# Patient Record
Sex: FEMALE | Race: WHITE | NOT HISPANIC OR LATINO | Employment: STUDENT | URBAN - METROPOLITAN AREA
[De-identification: names, ages, dates, MRNs, and addresses within clinical notes are randomized per-mention and may not be internally consistent; named-entity substitution may affect disease eponyms.]

---

## 2021-06-17 ENCOUNTER — HOSPITAL ENCOUNTER (OUTPATIENT)
Dept: LAB | Facility: MEDICAL CENTER | Age: 21
End: 2021-06-17
Attending: INTERNAL MEDICINE
Payer: COMMERCIAL

## 2021-06-17 LAB
ALBUMIN SERPL BCP-MCNC: 4.5 G/DL (ref 3.2–4.9)
ALBUMIN/GLOB SERPL: 1.6 G/DL
ALP SERPL-CCNC: 77 U/L (ref 30–99)
ALT SERPL-CCNC: 33 U/L (ref 2–50)
ANION GAP SERPL CALC-SCNC: 12 MMOL/L (ref 7–16)
AST SERPL-CCNC: 20 U/L (ref 12–45)
BILIRUB SERPL-MCNC: 0.4 MG/DL (ref 0.1–1.5)
BUN SERPL-MCNC: 10 MG/DL (ref 8–22)
CALCIUM SERPL-MCNC: 9.2 MG/DL (ref 8.5–10.5)
CHLORIDE SERPL-SCNC: 104 MMOL/L (ref 96–112)
CHOLEST SERPL-MCNC: 167 MG/DL (ref 100–199)
CO2 SERPL-SCNC: 24 MMOL/L (ref 20–33)
CREAT SERPL-MCNC: 0.61 MG/DL (ref 0.5–1.4)
CREAT UR-MCNC: 33.2 MG/DL
DHEA-S SERPL-MCNC: 348 UG/DL (ref 148–407)
FSH SERPL-ACNC: 4 MIU/ML
GLOBULIN SER CALC-MCNC: 2.8 G/DL (ref 1.9–3.5)
GLUCOSE SERPL-MCNC: 77 MG/DL (ref 65–99)
HDLC SERPL-MCNC: 52 MG/DL
LDLC SERPL CALC-MCNC: 100 MG/DL
LH SERPL-ACNC: 12.2 IU/L
PHOSPHATE SERPL-MCNC: 3.5 MG/DL (ref 2.5–4.5)
POTASSIUM SERPL-SCNC: 3.9 MMOL/L (ref 3.6–5.5)
POTASSIUM UR-SCNC: 17 MMOL/L
PROLACTIN SERPL-MCNC: 14.5 NG/ML (ref 2.8–26)
PROT SERPL-MCNC: 7.3 G/DL (ref 6–8.2)
PTH-INTACT SERPL-MCNC: 35.3 PG/ML (ref 14–72)
SODIUM SERPL-SCNC: 140 MMOL/L (ref 135–145)
SODIUM UR-SCNC: 22 MMOL/L
T3FREE SERPL-MCNC: 3.33 PG/ML (ref 2–4.4)
T4 FREE SERPL-MCNC: 1.56 NG/DL (ref 0.93–1.7)
TRIGL SERPL-MCNC: 73 MG/DL (ref 0–149)
TSH SERPL DL<=0.005 MIU/L-ACNC: 0.79 UIU/ML (ref 0.38–5.33)

## 2021-06-17 PROCEDURE — 82627 DEHYDROEPIANDROSTERONE: CPT

## 2021-06-17 PROCEDURE — 83970 ASSAY OF PARATHORMONE: CPT

## 2021-06-17 PROCEDURE — 84443 ASSAY THYROID STIM HORMONE: CPT

## 2021-06-17 PROCEDURE — 83001 ASSAY OF GONADOTROPIN (FSH): CPT

## 2021-06-17 PROCEDURE — 80061 LIPID PANEL: CPT

## 2021-06-17 PROCEDURE — 84305 ASSAY OF SOMATOMEDIN: CPT

## 2021-06-17 PROCEDURE — 36415 COLL VENOUS BLD VENIPUNCTURE: CPT

## 2021-06-17 PROCEDURE — 83525 ASSAY OF INSULIN: CPT

## 2021-06-17 PROCEDURE — 84105 ASSAY OF URINE PHOSPHORUS: CPT

## 2021-06-17 PROCEDURE — 84439 ASSAY OF FREE THYROXINE: CPT

## 2021-06-17 PROCEDURE — 82670 ASSAY OF TOTAL ESTRADIOL: CPT

## 2021-06-17 PROCEDURE — 84100 ASSAY OF PHOSPHORUS: CPT

## 2021-06-17 PROCEDURE — 84146 ASSAY OF PROLACTIN: CPT

## 2021-06-17 PROCEDURE — 83002 ASSAY OF GONADOTROPIN (LH): CPT

## 2021-06-17 PROCEDURE — 80053 COMPREHEN METABOLIC PANEL: CPT

## 2021-06-17 PROCEDURE — 84481 FREE ASSAY (FT-3): CPT

## 2021-06-17 PROCEDURE — 83520 IMMUNOASSAY QUANT NOS NONAB: CPT

## 2021-06-17 PROCEDURE — 84300 ASSAY OF URINE SODIUM: CPT

## 2021-06-17 PROCEDURE — 82340 ASSAY OF CALCIUM IN URINE: CPT

## 2021-06-17 PROCEDURE — 84403 ASSAY OF TOTAL TESTOSTERONE: CPT

## 2021-06-17 PROCEDURE — 82570 ASSAY OF URINE CREATININE: CPT

## 2021-06-17 PROCEDURE — 84133 ASSAY OF URINE POTASSIUM: CPT

## 2021-06-19 ENCOUNTER — HOSPITAL ENCOUNTER (OUTPATIENT)
Dept: LAB | Facility: MEDICAL CENTER | Age: 21
End: 2021-06-19
Attending: INTERNAL MEDICINE
Payer: COMMERCIAL

## 2021-06-19 LAB
CALCIUM 24H UR-MCNC: 1 MG/DL
CALCIUM 24H UR-MRATE: NORMAL MG/D
CALCIUM/CREAT 24H UR: 31 MG/G (ref 20–300)
COLLECT DURATION TIME SPEC: NORMAL HRS
COLLECT DURATION TIME SPEC: NORMAL HRS
CORTIS SERPL-MCNC: 20.8 UG/DL (ref 0–23)
CREAT 24H UR-MCNC: 32 MG/DL
CREAT 24H UR-MRATE: NORMAL MG/D (ref 700–1600)
ESTRADIOL SERPL-MCNC: 87 PG/ML
IGF-I SERPL-MCNC: 264 NG/ML (ref 109–372)
IGF-I Z-SCORE SERPL: 0.4
INSULIN P FAST SERPL-ACNC: 4 UIU/ML (ref 3–19)
PHOSPHATE 24H UR-MCNC: 14 MG/DL
PHOSPHATE 24H UR-MRATE: NORMAL MG/D (ref 400–1300)
PHOSPHATE/CREAT 24H UR: 438 MG/G
SPECIMEN VOL ?TM UR: NORMAL ML
TOTAL VOLUME 1105: NORMAL ML

## 2021-06-19 PROCEDURE — 82024 ASSAY OF ACTH: CPT

## 2021-06-19 PROCEDURE — 36415 COLL VENOUS BLD VENIPUNCTURE: CPT

## 2021-06-19 PROCEDURE — 82088 ASSAY OF ALDOSTERONE: CPT

## 2021-06-19 PROCEDURE — 82533 TOTAL CORTISOL: CPT

## 2021-06-21 LAB — LEPTIN SERPL-MCNC: 9.9 NG/ML (ref 0.5–15.2)

## 2021-06-22 LAB
ACTH PLAS-MCNC: 75.7 PG/ML (ref 7.2–63.3)
ALDOST SERPL-MCNC: 18.3 NG/DL

## 2021-06-23 LAB — TESTOST SERPL-MCNC: 66 NG/DL (ref 9–55)

## 2021-06-26 ENCOUNTER — HOSPITAL ENCOUNTER (OUTPATIENT)
Dept: LAB | Facility: MEDICAL CENTER | Age: 21
End: 2021-06-26
Attending: INTERNAL MEDICINE
Payer: COMMERCIAL

## 2021-06-26 LAB
CORTIS SERPL-MCNC: 0.8 UG/DL (ref 0–23)
DHEA-S SERPL-MCNC: 220 UG/DL (ref 148–407)
FSH SERPL-ACNC: 4.5 MIU/ML
LH SERPL-ACNC: 14.4 IU/L
TESTOST SERPL-MCNC: 33 NG/DL (ref 9–75)

## 2021-06-26 PROCEDURE — 83498 ASY HYDROXYPROGESTERONE 17-D: CPT

## 2021-06-26 PROCEDURE — 82088 ASSAY OF ALDOSTERONE: CPT

## 2021-06-26 PROCEDURE — 36415 COLL VENOUS BLD VENIPUNCTURE: CPT

## 2021-06-26 PROCEDURE — 82627 DEHYDROEPIANDROSTERONE: CPT

## 2021-06-26 PROCEDURE — 82024 ASSAY OF ACTH: CPT

## 2021-06-26 PROCEDURE — 83001 ASSAY OF GONADOTROPIN (FSH): CPT

## 2021-06-26 PROCEDURE — 84403 ASSAY OF TOTAL TESTOSTERONE: CPT

## 2021-06-26 PROCEDURE — 82533 TOTAL CORTISOL: CPT

## 2021-06-26 PROCEDURE — 83002 ASSAY OF GONADOTROPIN (LH): CPT

## 2021-06-29 LAB
ACTH PLAS-MCNC: 4.6 PG/ML (ref 7.2–63.3)
ALDOST SERPL-MCNC: 11.7 NG/DL

## 2021-07-03 LAB — 17OHP SERPL-MCNC: 92.63 NG/DL

## 2021-10-06 ENCOUNTER — TELEPHONE (OUTPATIENT)
Dept: ENDOCRINOLOGY | Facility: MEDICAL CENTER | Age: 21
End: 2021-10-06

## 2021-10-06 NOTE — TELEPHONE ENCOUNTER
Pt's mother called to see if accept their ins and to book an appt. I let them know we're currently booking out til March and they said they would need something sooner and we disconnected

## 2021-11-06 ENCOUNTER — HOSPITAL ENCOUNTER (OUTPATIENT)
Dept: LAB | Facility: MEDICAL CENTER | Age: 21
End: 2021-11-06
Attending: INTERNAL MEDICINE
Payer: COMMERCIAL

## 2021-11-06 LAB
25(OH)D3 SERPL-MCNC: 23 NG/ML (ref 30–100)
CORTIS SERPL-MCNC: 17.4 UG/DL (ref 0–23)
DHEA-S SERPL-MCNC: 300 UG/DL (ref 148–407)
T3FREE SERPL-MCNC: 3.61 PG/ML (ref 2–4.4)
T4 FREE SERPL-MCNC: 1.5 NG/DL (ref 0.93–1.7)
TESTOST SERPL-MCNC: 34 NG/DL (ref 9–75)
THYROPEROXIDASE AB SERPL-ACNC: 14 IU/ML (ref 0–9)
TSH SERPL DL<=0.005 MIU/L-ACNC: 1.01 UIU/ML (ref 0.38–5.33)
VIT B12 SERPL-MCNC: 457 PG/ML (ref 211–911)

## 2021-11-06 PROCEDURE — 84439 ASSAY OF FREE THYROXINE: CPT

## 2021-11-06 PROCEDURE — 84403 ASSAY OF TOTAL TESTOSTERONE: CPT

## 2021-11-06 PROCEDURE — 86376 MICROSOMAL ANTIBODY EACH: CPT

## 2021-11-06 PROCEDURE — 36415 COLL VENOUS BLD VENIPUNCTURE: CPT

## 2021-11-06 PROCEDURE — 84270 ASSAY OF SEX HORMONE GLOBUL: CPT

## 2021-11-06 PROCEDURE — 82533 TOTAL CORTISOL: CPT

## 2021-11-06 PROCEDURE — 84443 ASSAY THYROID STIM HORMONE: CPT

## 2021-11-06 PROCEDURE — 82627 DEHYDROEPIANDROSTERONE: CPT

## 2021-11-06 PROCEDURE — 84481 FREE ASSAY (FT-3): CPT

## 2021-11-06 PROCEDURE — 82626 DEHYDROEPIANDROSTERONE: CPT

## 2021-11-06 PROCEDURE — 82157 ASSAY OF ANDROSTENEDIONE: CPT

## 2021-11-06 PROCEDURE — 82306 VITAMIN D 25 HYDROXY: CPT

## 2021-11-06 PROCEDURE — 82024 ASSAY OF ACTH: CPT

## 2021-11-06 PROCEDURE — 82607 VITAMIN B-12: CPT

## 2021-11-07 ENCOUNTER — HOSPITAL ENCOUNTER (OUTPATIENT)
Facility: MEDICAL CENTER | Age: 21
End: 2021-11-07
Attending: INTERNAL MEDICINE
Payer: COMMERCIAL

## 2021-11-07 PROCEDURE — 82533 TOTAL CORTISOL: CPT

## 2021-11-08 ENCOUNTER — HOSPITAL ENCOUNTER (OUTPATIENT)
Facility: MEDICAL CENTER | Age: 21
End: 2021-11-08
Attending: INTERNAL MEDICINE
Payer: COMMERCIAL

## 2021-11-08 LAB
CREAT 24H UR-MSRATE: 643 MG/24 HR (ref 800–1800)
CREAT UR-MCNC: 80.43 MG/DL
SPECIMEN VOL UR: 800 ML

## 2021-11-08 PROCEDURE — 82570 ASSAY OF URINE CREATININE: CPT

## 2021-11-08 PROCEDURE — 81050 URINALYSIS VOLUME MEASURE: CPT

## 2021-11-08 PROCEDURE — 82530 CORTISOL FREE: CPT

## 2021-11-08 PROCEDURE — 82533 TOTAL CORTISOL: CPT

## 2021-11-09 LAB
ACTH PLAS-MCNC: 53.5 PG/ML (ref 7.2–63.3)
SHBG SERPL-SCNC: 43 NMOL/L (ref 30–135)

## 2021-11-11 LAB
ANDROST SERPL-MCNC: 1.74 NG/ML (ref 0.26–2.14)
CORTIS SAL-MCNC: 0.05 UG/DL
CORTIS SAL-MCNC: 0.05 UG/DL
DHEA SERPL-MCNC: 11.81 NG/ML (ref 1.33–7.78)

## 2021-11-12 LAB — TEST NAME 95000: NORMAL
